# Patient Record
Sex: FEMALE | Race: OTHER
[De-identification: names, ages, dates, MRNs, and addresses within clinical notes are randomized per-mention and may not be internally consistent; named-entity substitution may affect disease eponyms.]

---

## 2021-02-18 ENCOUNTER — HOSPITAL ENCOUNTER (EMERGENCY)
Dept: HOSPITAL 61 - ER | Age: 36
Discharge: HOME | End: 2021-02-18
Payer: COMMERCIAL

## 2021-02-18 VITALS — BODY MASS INDEX: 40.47 KG/M2 | HEIGHT: 63 IN | WEIGHT: 228.4 LBS

## 2021-02-18 VITALS — SYSTOLIC BLOOD PRESSURE: 127 MMHG | DIASTOLIC BLOOD PRESSURE: 58 MMHG

## 2021-02-18 DIAGNOSIS — K21.9: ICD-10-CM

## 2021-02-18 DIAGNOSIS — G43.909: ICD-10-CM

## 2021-02-18 DIAGNOSIS — M94.0: Primary | ICD-10-CM

## 2021-02-18 DIAGNOSIS — Z87.891: ICD-10-CM

## 2021-02-18 DIAGNOSIS — Z88.6: ICD-10-CM

## 2021-02-18 PROCEDURE — 81025 URINE PREGNANCY TEST: CPT

## 2021-02-18 PROCEDURE — 93005 ELECTROCARDIOGRAM TRACING: CPT

## 2021-02-18 PROCEDURE — 71045 X-RAY EXAM CHEST 1 VIEW: CPT

## 2021-02-18 PROCEDURE — 99283 EMERGENCY DEPT VISIT LOW MDM: CPT

## 2021-02-18 NOTE — EKG
[facility]

              [address] [place] [address]

Test Date:    2021               Test Time:    00:10:30

Pat Name:     TONYA MORTENSEN             Department:   

Patient ID:   [id number]           Room:          

Gender:       F                        Technician:   

:          1985               Requested By: BREA KATZ

Order Number: [id number]           Reading MD:     

                                 Measurements

Intervals                              Axis          

Rate:         96                       P:            48

WV:           152                      QRS:          86

QRSD:         76                       T:            27

QT:           336                                    

QTc:          431                                    

                           Interpretive Statements

SINUS RHYTHM

OTHERWISE NORMAL ECG

RI6.02

No previous ECG available for comparison

## 2021-02-18 NOTE — ED.ADGEN
Past Medical History


Past Medical History:  GERD, Migraines, Other


Additional Past Medical Histor:  "BACK PROBLEMS"


Past Surgical History:  No Surgical History


Smoking Status:  Former Smoker


Alcohol Use:  None





General Adult


EDM:


Chief Complaint:  CHEST PAIN





HPI:


HPI:


Patient is a 35-year-old previously healthy female who presents to the emergency

room complaining of substernal chest pain when she coughs.  She states that she 

has had a cough for "a while" but it seems to have gotten worse over the last 

week.  She states that since last night she has been having coughing fits that 

lead to vomiting and she feels like it is hard to catch her breath after these 

coughing fits.  When she is not coughing she does not have any shortness of 

breath.  She denies any fever, chills, sweats, URI symptoms, nausea, diarrhea, 

abdominal pain.





Review of Systems:


Review of Systems:


Complete ROS is negative unless otherwise documented in HPI





Allergies:


Allergies:





Allergies








Coded Allergies Type Severity Reaction Last Updated Verified


 


  aspirin Allergy Unknown HIVES 2/18/21 Yes











Physical Exam:


PE:


General: Awake, alert, NAD. Well Nourished, well hydrated. Cooperative


HEENT: Atraumatic, EOMI, PERRL, airway patent, moist oral mucosa


Neck: Supple, trachea midline


Respiratory: CTA bilaterally, normal effort, no wheezing/crackles


CV: RRR, no murmur, cap refill <2


GI: Soft, nondistended, nontender, no masses


MSK: No obvious deformities


Skin: Warm, dry, intact


Neuro: A&O x3, speech NL, sensory and motor grossly intact, no focal deficits


Psych: Normal affect, normal mood, not suicidal or homicidal





Current Patient Data:


Labs:





                                Laboratory Tests








Test


 2/18/21


00:43


 


POC Urine HCG, Qualitative


 Hcg negative


(Negative)








Vital Signs:





                                   Vital Signs








  Date Time  Temp Pulse Resp B/P (MAP) Pulse Ox O2 Delivery O2 Flow Rate FiO2


 


2/18/21 00:08 97.9 94 20 104/63 (77) 96 Room Air  





 97.9       











EKG:


EKG:


[]





Heart Score:


Risk Factors:


Risk Factors:  DM, Current or recent (<one month) smoker, HTN, HLP, family 

history of CAD, obesity.


Risk Scores:


Score 0 - 3:  2.5% MACE over next 6 weeks - Discharge Home


Score 4 - 6:  20.3% MACE over next 6 weeks - Admit for Clinical Observation


Score 7 - 10:  72.7% MACE over next 6 weeks - Early Invasive Strategies





Radiology/Procedures:


Radiology/Procedures:


[]





Course & Med Decision Making:


Course & Med Decision Making


Pertinent Labs and Imaging studies reviewed. (See chart for details)





Patient is a 35-year-old female who presents to the emergency room with chest 

pain when coughing.  Pain is reproducible on exam.  Patient likely has 

costochondritis.  Chest x-ray is normal.  Vitals are normal.  Patient is overall

 well-appearing.  EKG is normal.  At this time pain is most consistent with 

musculoskeletal and patient does not need a cardiac work-up at this time.  

Patient's test results and vitals while in the ED were fully reviewed and 

discussed with the patient. Patient is stable and at this time does not need 

admission to the hospital. We have discussed strict return precautions and the 

importance of following up with their Primary Care Physician. Patient stated 

understanding and was given an opportunity to ask any questions. Patient is in 

agreement with plan.





Dragon Disclaimer:


Dragon Disclaimer:


This electronic medical record was generated, in whole or in part, using a voice

 recognition dictation system.





Departure


Departure


Impression:  


   Primary Impression:  


   Costal chondritis


   Additional Impression:  


   Cough


Disposition:  01 DC HOME SELF CARE/HOMELESS


Condition:  STABLE


Patient Instructions:  Costochondritis, Cough, Adult


Scripts


Guaifenesin/Codeine Phosphate (Codeine-Guaifen  mg/5 ml) 120 Ml Liquid


5 ML PO PRN Q6HRS PRN for cough and congestion MDD 20 Milliliter(s) for 6 Days, 

#60 ML 0 Refills


   Prov: BREA KATZ MD         2/18/21 


Benzonatate (TESSALON PERLE) 100 Mg Capsule


1 CAP PO TID for cough, #21 CAP


   Prov: BREA KATZ MD         2/18/21





Problem Qualifiers











BREA KATZ MD              Feb 18, 2021 01:11

## 2021-02-18 NOTE — RAD
AP portable chest  radiograph 2/18/2021



Clinical History: Shortness of breath.



An AP erect portable digital radiograph of the chest was obtained.



The cardiac and mediastinal silhouettes are within normal limits in size and configuration. No pulmon
martin infiltrate is seen. No pleural effusion or pneumothorax is noted. The osseous structures are odette
sly intact.



IMPRESSION: No acute abnormality is seen.



Electronically signed by: Daniel Plascencia MD (2/18/2021 2:15 AM) [id number]